# Patient Record
Sex: FEMALE | Race: BLACK OR AFRICAN AMERICAN | NOT HISPANIC OR LATINO | Employment: FULL TIME | URBAN - METROPOLITAN AREA
[De-identification: names, ages, dates, MRNs, and addresses within clinical notes are randomized per-mention and may not be internally consistent; named-entity substitution may affect disease eponyms.]

---

## 2018-02-08 ENCOUNTER — APPOINTMENT (OUTPATIENT)
Dept: GENERAL RADIOLOGY | Facility: HOSPITAL | Age: 48
End: 2018-02-08

## 2018-02-08 PROCEDURE — 73030 X-RAY EXAM OF SHOULDER: CPT | Performed by: GENERAL PRACTICE

## 2018-02-08 PROCEDURE — 72050 X-RAY EXAM NECK SPINE 4/5VWS: CPT | Performed by: GENERAL PRACTICE

## 2018-02-08 PROCEDURE — 73502 X-RAY EXAM HIP UNI 2-3 VIEWS: CPT | Performed by: GENERAL PRACTICE

## 2018-02-08 PROCEDURE — 72072 X-RAY EXAM THORAC SPINE 3VWS: CPT | Performed by: GENERAL PRACTICE

## 2020-03-09 ENCOUNTER — APPOINTMENT (OUTPATIENT)
Dept: GENERAL RADIOLOGY | Facility: HOSPITAL | Age: 50
End: 2020-03-09

## 2020-03-09 PROCEDURE — 71046 X-RAY EXAM CHEST 2 VIEWS: CPT | Performed by: FAMILY MEDICINE

## 2024-11-07 ENCOUNTER — TRANSCRIBE ORDERS (OUTPATIENT)
Dept: PHYSICAL THERAPY | Facility: CLINIC | Age: 54
End: 2024-11-07
Payer: COMMERCIAL

## 2024-11-07 DIAGNOSIS — M54.41 CHRONIC RIGHT-SIDED LOW BACK PAIN WITH RIGHT-SIDED SCIATICA: Primary | ICD-10-CM

## 2024-11-07 DIAGNOSIS — M50.20 PROTRUSION OF CERVICAL INTERVERTEBRAL DISC: ICD-10-CM

## 2024-11-07 DIAGNOSIS — G89.29 CHRONIC RIGHT-SIDED LOW BACK PAIN WITH RIGHT-SIDED SCIATICA: Primary | ICD-10-CM

## 2024-11-11 ENCOUNTER — TREATMENT (OUTPATIENT)
Dept: PHYSICAL THERAPY | Facility: CLINIC | Age: 54
End: 2024-11-11
Payer: COMMERCIAL

## 2024-11-11 DIAGNOSIS — M54.50 CHRONIC BILATERAL LOW BACK PAIN WITHOUT SCIATICA: ICD-10-CM

## 2024-11-11 DIAGNOSIS — G89.29 CHRONIC BILATERAL LOW BACK PAIN WITHOUT SCIATICA: ICD-10-CM

## 2024-11-11 DIAGNOSIS — M50.20 PROTRUSION OF CERVICAL INTERVERTEBRAL DISC: Primary | ICD-10-CM

## 2024-11-11 NOTE — PROGRESS NOTES
Physical Therapy Initial Evaluation and Plan of Care    Patient: Cesar Case   : 1970  Diagnosis/ICD-10 Code:  Protrusion of cervical intervertebral disc [M50.20]  Referring practitioner: Emerald Gama MD  Date of Initial Visit: 2024  Today's Date: 2024  Patient seen for 1 session         Visit Diagnoses:    ICD-10-CM ICD-9-CM   1. Protrusion of cervical intervertebral disc  M50.20 722.0   2. Chronic bilateral low back pain without sciatica  M54.50 724.2    G89.29 338.29         Subjective Questionnaire: NDI: 4% limitation   Oswestry: 22% limitation      Subjective Evaluation    History of Present Illness  Mechanism of injury: Disc herniation in C-spine, has had pain off and on for 10 years. In the last 6 months has been having numbness in LUE to elbow. Denies headaches. B low back pain, worse with bending over. Intermittent, not every day. No LE pain, occasional numbness in toes. MRI planned for lumbar spine this Saturday. MD gave her tramadol and methocarbamol, but pt states they make her too sedated. Taking naproxen. Neck and arm are more bothersome than her back at this time.       Patient Occupation:  Quality of life: good    Pain  Pain scale: Neck 8/10; Back 3/10.  Pain scale at lowest: Neck 8/10; Back 1/10.  Pain scale at highest: Neck 10/10; Back 9/10.  Location: L C-spine/UE, B low back  Quality: dull ache and discomfort  Relieving factors: medications and heat (Naproxen)  Aggravating factors: keyboarding (Sitting makes back worse)  Progression: worsening    Social Support  Lives in: multiple-level home  Lives with: spouse    Diagnostic Tests  Abnormal MRI: see report in chart.    Patient Goals  Patient goals for therapy: decreased pain           Objective          Static Posture     Head  Forward.    Shoulders  Rounded.    Palpation   Left   No palpable tenderness to the cervical paraspinals, iliopsoas, lumbar paraspinals, quadratus lumborum, scalenes and  suboccipitals.   Hypertonic in the levator scapulae, rhomboids and upper trapezius.   Tenderness of the levator scapulae, rhomboids and upper trapezius.     Right   No palpable tenderness to the cervical paraspinals, iliopsoas, levator scapulae, lumbar paraspinals, quadratus lumborum, rhomboids, scalenes, suboccipitals and upper trapezius.   Hypertonic in the upper trapezius.     Tenderness     Lumbar Spine  Tenderness in the spinous process and facet joint.     Left Hip   No tenderness in the ASIS and PSIS.     Right Hip   No tenderness in the ASIS and PSIS.     Neurological Testing     Sensation   Cervical/Thoracic   Left   Paresthesia: light touch    Right   Intact: light touch    Lumbar   Left   Intact: light touch    Right   Intact: light touch    Active Range of Motion   Cervical/Thoracic Spine   Cervical    Flexion: 40 degrees   Extension: 39 degrees   Left lateral flexion: 24 degrees with pain  Right lateral flexion: 35 degrees     Lumbar   Normal active range of motion    Strength/Myotome Testing     Left Shoulder     Planes of Motion   Abduction: 5   External rotation at 0°: 5     Isolated Muscles   Lower trapezius: 4   Middle trapezius: 4   Serratus anterior: 4     Right Shoulder     Planes of Motion   Abduction: 5   External rotation at 0°: 5     Isolated Muscles   Lower trapezius: 4   Middle trapezius: 4   Serratus anterior: 4     Left Elbow   Flexion: 5  Extension: 5    Right Elbow   Flexion: 5  Extension: 5    Left Hip   Planes of Motion   Flexion: 4+  Extension: 4  Abduction: 4    Right Hip   Planes of Motion   Flexion: 4+  Extension: 4  Abduction: 4    Left Knee   Flexion: 5  Extension: 5    Right Knee   Flexion: 5  Extension: 5    Left Ankle/Foot   Dorsiflexion: 5  Plantar flexion: 5  Great toe extension: 5    Right Ankle/Foot   Dorsiflexion: 5  Plantar flexion: 5  Great toe extension: 5    Tests   Cervical     Left   Positive active compression (Duval), cervical distraction and Spurling's sign.      Right   Negative active compression (Ulster), cervical distraction and Spurling's sign.     Thoracic   Negative slump.     Lumbar     Left   Negative passive SLR and quadrant.     Right   Negative passive SLR and quadrant.     Left Pelvic Girdle/Sacrum   Negative: sacral spring.     Right Pelvic Girdle/Sacrum   Negative: sacral spring.           Assessment & Plan       Assessment  Impairments: activity intolerance, impaired physical strength, lacks appropriate home exercise program and pain with function   Functional limitations: lifting, sleeping, walking, uncomfortable because of pain, moving in bed and sitting   Assessment details: Pt presents with neck pain with pain/numbness in LUE, postural dysfunction, scapular stabilizer weakness, core weakness, low back pain and decreased LE flexibility. She will benefit from skilled PT services in order to address impairments and facilitate return to baseline performance of daily activities including ADL's, work and recreational activities.      Prognosis: good    Goals  Plan Goals: Short Term Goals: 6 weeks. Patient will:  1. Be independent with initial HEP  2. Be instructed in posture and body mechanics  3. Demonstrate TrA contraction during exercises in clinic without need for cueing.  4. Report 50% improvement in pain/numbness in LUE.     Long Term Goals: 12 weeks. Patient will:  1. Demonstrate improved Bilateralscapular and hip MMT of >/= 4+/5 to allow for performance of daily activities without pain.  2. Demonstrate lower extremity flexibility WFL to allow for return to household & recreational activities w/o increased symptoms  3. Report 90% improvement in pain/numbness in LUE.   4. Perceived disability </= 10% as measured by Oswestry Questionnaire.    Plan  Therapy options: will be seen for skilled therapy services  Planned modality interventions: cryotherapy, thermotherapy (hydrocollator packs), TENS, ultrasound, traction and dry needling  Planned therapy  interventions: abdominal trunk stabilization, manual therapy, neuromuscular re-education, body mechanics training, postural training, soft tissue mobilization, spinal/joint mobilization, strengthening, stretching, home exercise program, functional ROM exercises and flexibility  Frequency: 2x week  Duration in weeks: 12  Treatment plan discussed with: patient      History # of Personal Factors and/or Comorbidities: MODERATE (1-2)  Examination of Body System(s): # of elements: LOW (1-2)  Clinical Presentation: EVOLVING  Clinical Decision Making: LOW       Timed:         Manual Therapy:    10     mins  82178;     Therapeutic Exercise:    10     mins  84346;     Neuromuscular Danie:    0    mins  36074;    Therapeutic Activity:     5     mins  72240;     Gait Trainin     mins  13949;     Ultrasound:     0     mins  32145;    Ionto                               0    mins   31414  Self Care                       0     mins   25532      Un-Timed:  Electrical Stimulation:    0     mins  71557 ( );  Dry Needling     0     mins self-pay  Traction     0     mins 07721  Low Eval     25     Mins  54769  Mod Eval     0     Mins  66822  High Eval                       0     Mins  05927  Canalith Repos    0     mins 54430      Timed Treatment:   25   mins   Total Treatment:     50   mins          PT: Salina Pineda PT     License Number: [id number]  Electronically signed by Salina Pineda PT, 24, 5:10 PM EST    Certification Period: 2024 thru 2025  I certify that the therapy services are furnished while this patient is under my care.  The services outlined above are required by this patient, and will be reviewed every 90 days.         Physician Signature:__________________________________________________    PHYSICIAN: Emerald Gama MD  NPI: [id number]                                      DATE:      Please sign and return via fax to [email or web address] . Thank you, [facility].

## 2024-11-18 ENCOUNTER — TREATMENT (OUTPATIENT)
Dept: PHYSICAL THERAPY | Facility: CLINIC | Age: 54
End: 2024-11-18
Payer: COMMERCIAL

## 2024-11-18 DIAGNOSIS — M54.50 CHRONIC BILATERAL LOW BACK PAIN WITHOUT SCIATICA: ICD-10-CM

## 2024-11-18 DIAGNOSIS — M50.20 PROTRUSION OF CERVICAL INTERVERTEBRAL DISC: Primary | ICD-10-CM

## 2024-11-18 DIAGNOSIS — G89.29 CHRONIC BILATERAL LOW BACK PAIN WITHOUT SCIATICA: ICD-10-CM

## 2024-11-18 NOTE — PROGRESS NOTES
"Physical Therapy Daily Treatment Note      Patient: Cesar Case   : 1970  Referring practitioner: Emerald Gama MD  Date of Initial Visit: Type: THERAPY  Noted: 2024  Today's Date: 2024  Patient seen for 2 sessions       Visit Diagnoses:    ICD-10-CM ICD-9-CM   1. Protrusion of cervical intervertebral disc  M50.20 722.0   2. Chronic bilateral low back pain without sciatica  M54.50 724.2    G89.29 338.29       Subjective   Pt states neck is \"a little better\".    Objective   See Exercise, Manual, and Modality Logs for complete treatment.       Assessment/Plan    Progressed scapular stabilization exercises today, tolerated well without pain.         Timed:         Manual Therapy:    10     mins  92864;     Therapeutic Exercise:    10     mins  06361;     Neuromuscular Danie:    8    mins  31791;    Therapeutic Activity:     10     mins  05998;     Gait Trainin     mins  22086;     Ultrasound:     0     mins  73642;    Ionto                               0    mins   99495  Self Care                       0     mins   33071  Canalith Repos    0     mins  18965      Un-Timed:  Electrical Stimulation:    0     mins  04390 ( );  Dry Needling     0     mins self-pay  Traction     0     mins 82548      Timed Treatment:   38   mins   Total Treatment:     38   mins    Salina Pineda, PT  [place] License: [id number]                  "

## 2024-11-25 ENCOUNTER — TREATMENT (OUTPATIENT)
Dept: PHYSICAL THERAPY | Facility: CLINIC | Age: 54
End: 2024-11-25
Payer: COMMERCIAL

## 2024-11-25 DIAGNOSIS — M50.20 PROTRUSION OF CERVICAL INTERVERTEBRAL DISC: Primary | ICD-10-CM

## 2024-11-25 DIAGNOSIS — G89.29 CHRONIC BILATERAL LOW BACK PAIN WITHOUT SCIATICA: ICD-10-CM

## 2024-11-25 DIAGNOSIS — M54.50 CHRONIC BILATERAL LOW BACK PAIN WITHOUT SCIATICA: ICD-10-CM

## 2024-11-25 NOTE — PROGRESS NOTES
Physical Therapy Daily Treatment Note      Patient: Cesar Case   : 1970  Referring practitioner: Emerald Gama MD  Date of Initial Visit: Type: THERAPY  Noted: 2024  Today's Date: 2024  Patient seen for 3 sessions       Visit Diagnoses:    ICD-10-CM ICD-9-CM   1. Protrusion of cervical intervertebral disc  M50.20 722.0   2. Chronic bilateral low back pain without sciatica  M54.50 724.2    G89.29 338.29       Subjective   Pt states pain is bad today, going down her arm. Continued adherence with HEP, manual therapy was helpful last time but relief only lasted a day.     Objective   See Exercise, Manual, and Modality Logs for complete treatment.       Assessment/Plan    Trial of mechanical traction today, will assess effect at next visit.        Timed:         Manual Therapy:    0     mins  56250;     Therapeutic Exercise:    10     mins  99764;     Neuromuscular Danie:    0    mins  90374;    Therapeutic Activity:     10     mins  72386;     Gait Trainin     mins  17288;     Ultrasound:     0     mins  09084;    Ionto                               0    mins   06368  Self Care                       0     mins   09446  Canalith Repos    0     mins  70733      Un-Timed:  Electrical Stimulation:    0     mins  42132 ( );  Dry Needling     0     mins self-pay  Traction     10     mins 43877      Timed Treatment:   20   mins   Total Treatment:     30   mins    Salina Pineda, PT  [place] License: [id number]

## 2024-12-02 ENCOUNTER — TREATMENT (OUTPATIENT)
Dept: PHYSICAL THERAPY | Facility: CLINIC | Age: 54
End: 2024-12-02
Payer: COMMERCIAL

## 2024-12-02 DIAGNOSIS — G89.29 CHRONIC BILATERAL LOW BACK PAIN WITHOUT SCIATICA: ICD-10-CM

## 2024-12-02 DIAGNOSIS — M54.50 CHRONIC BILATERAL LOW BACK PAIN WITHOUT SCIATICA: ICD-10-CM

## 2024-12-02 DIAGNOSIS — M50.20 PROTRUSION OF CERVICAL INTERVERTEBRAL DISC: Primary | ICD-10-CM

## 2024-12-02 NOTE — PROGRESS NOTES
Physical Therapy Daily Treatment Note      Patient: Cesar Case   : 1970  Referring practitioner: Emerald Gama MD  Date of Initial Visit: Type: THERAPY  Noted: 2024  Today's Date: 2024  Patient seen for 4 sessions       Visit Diagnoses:    ICD-10-CM ICD-9-CM   1. Protrusion of cervical intervertebral disc  M50.20 722.0   2. Chronic bilateral low back pain without sciatica  M54.50 724.2    G89.29 338.29       Subjective   Pt states she had much less pain while she was off for the holiday. Feels that sitting for long periods of time makes her pain worse.     Objective   See Exercise, Manual, and Modality Logs for complete treatment.       Assessment/Plan    Discussed how to assess and modify work station to optimize posture, as well as working in more breaks for walks, shoulder rolls, chin tucks for postural correction.         Timed:         Manual Therapy:    0     mins  45760;     Therapeutic Exercise:    10     mins  64982;     Neuromuscular Danie:    0    mins  21395;    Therapeutic Activity:     10     mins  18845;     Gait Trainin     mins  93511;     Ultrasound:     0     mins  82285;    Ionto                               0    mins   15317  Self Care                       0     mins   08666  Canalith Repos    0     mins  93997      Un-Timed:  Electrical Stimulation:    0     mins  90423 ( );  Dry Needling     0     mins self-pay  Traction     10     mins 39050      Timed Treatment:   20   mins   Total Treatment:     30   mins    Salina Pineda, PT  [place] License: [id number]

## 2024-12-09 ENCOUNTER — TREATMENT (OUTPATIENT)
Dept: PHYSICAL THERAPY | Facility: CLINIC | Age: 54
End: 2024-12-09
Payer: COMMERCIAL

## 2024-12-09 DIAGNOSIS — G89.29 CHRONIC BILATERAL LOW BACK PAIN WITHOUT SCIATICA: ICD-10-CM

## 2024-12-09 DIAGNOSIS — M50.20 PROTRUSION OF CERVICAL INTERVERTEBRAL DISC: Primary | ICD-10-CM

## 2024-12-09 DIAGNOSIS — M54.50 CHRONIC BILATERAL LOW BACK PAIN WITHOUT SCIATICA: ICD-10-CM

## 2024-12-09 PROCEDURE — 97530 THERAPEUTIC ACTIVITIES: CPT | Performed by: PHYSICAL THERAPIST

## 2024-12-09 PROCEDURE — 97012 MECHANICAL TRACTION THERAPY: CPT | Performed by: PHYSICAL THERAPIST

## 2024-12-09 PROCEDURE — 97110 THERAPEUTIC EXERCISES: CPT | Performed by: PHYSICAL THERAPIST

## 2024-12-09 NOTE — PROGRESS NOTES
Physical Therapy Daily Treatment Note      Patient: Cesar Case   : 1970  Referring practitioner: Emerald Gama MD  Date of Initial Visit: Type: THERAPY  Noted: 2024  Today's Date: 2024  Patient seen for 5 sessions       Visit Diagnoses:    ICD-10-CM ICD-9-CM   1. Protrusion of cervical intervertebral disc  M50.20 722.0   2. Chronic bilateral low back pain without sciatica  M54.50 724.2    G89.29 338.29       Subjective   Traction helps but only for about a day.     Objective   See Exercise, Manual, and Modality Logs for complete treatment.       Assessment/Plan    Subjectively, pt reports no increase of pain or discomfort with interventions performed today. Increased pull of traction, which helped. May benefit from home traction unit.       Timed:         Manual Therapy:    0     mins  62647;     Therapeutic Exercise:    15     mins  04014;     Neuromuscular Danie:    0    mins  86797;    Therapeutic Activity:     10     mins  12349;     Gait Trainin     mins  44788;     Ultrasound:     0     mins  15881;    Ionto                               0    mins   01820  Self Care                       0     mins   00493  Canalith Repos    0     mins  34263      Un-Timed:  Electrical Stimulation:    0     mins  96149 ( );  Dry Needling     0     mins self-pay  Traction     10     mins 26894      Timed Treatment:   25   mins   Total Treatment:     35   mins    Salina Pineda, PT  [place] License: [id number]

## 2024-12-23 ENCOUNTER — TELEPHONE (OUTPATIENT)
Dept: PHYSICAL THERAPY | Facility: CLINIC | Age: 54
End: 2024-12-23

## 2025-04-22 ENCOUNTER — PATIENT ROUNDING (BHMG ONLY) (OUTPATIENT)
Dept: URGENT CARE | Facility: CLINIC | Age: 55
End: 2025-04-22
Payer: COMMERCIAL

## 2025-04-22 NOTE — ED NOTES
Thank you for letting us care for you during your recent visit at [facility]. We would love to hear about your experience with us.     We’re always looking for ways to make our patients’ experiences even better. Do you have any recommendations on ways we may improve?    I appreciate you taking the time to respond. Please be on the lookout for a survey about your recent visit from [facility] via text or email. We would greatly appreciate if you could fill that out and turn it back in. We want your voice to be heard and we value your feedback.     Thank you for choosing [facility] for your healthcare needs.